# Patient Record
(demographics unavailable — no encounter records)

---

## 2025-01-13 NOTE — HISTORY OF PRESENT ILLNESS
[FreeTextEntry1] : Name DUNG VALLECILLO MRN [id number]  Oct 2 1995 ------------------------------------------------------------------------------------------------------------------------------------------- Date of First Visit: 2023 Referring Provider / PCP: self ([facility]) / Dr. Elliot Dorado ------------------------------------------------------------------------------------------------------------------------------------------- CC: weak urinary stream; urinary frequency  History of Present Illness: DUNG VALLECILLO is a 27-year-old male who presents for evaluation of weak urinary stream, straining, and post void dribbling for over 1 year. Also with urinary frequency and urgency. No dysuria, hematuria, sensation of incomplete emptying, hesitancy. Symptoms are typically worse after drinking alcohol and minimally improved with Tamsulosin, which he has been on for almost 1 year, having been prescribed by another urologist. Acknowledges he has had retrograde ejaculation while on the medication. Denies any diagnostic evaluations such as UDS or cystoscopy. No prior UTI or STI. Drinks alcohol 3 times per week, vapes 2 times per week. Drink 1 cup of coffee per day and 1-2 cups of tea. Rarely eats spicy foods. No history of UTI or STI. No h/o urethral/ trauma. PVR 0 cc ------------------------------------------------------------------------------------------------------------------------------------------- Interval History (2023): No improvement from prior visit avoiding bladder triggers. Had some worsening of symptoms when taking a period off of Flomax but restarted it and now back at baseline. Uroflow: Qmax 12 ml/s ==> he states this is a better subjective flow for him but many times is worse PVR: 10 cc ------------------------------------------------------------------------------------------------------------------------------------------- Interval History (10/20/2023): cystoscopy performed today demonstrates short segment bulbar urethral stricture. ------------------------------------------------------------------------------------------------------------------------------------------- 24: s/p Dorsal [place]lay Buccal Graft Urethroplasty Findings: 3-4cm buccal graft harvested, stricture in mid-bulbar urethra ------------------------------------------------------------------------------------------------------------------------------------------- Interval History 25: Presents today for f/u. Last seen in the office 24.  He is now 1y post op from procedure above.   Post-op, the pt notified me that he was having some numbness in his left hand, particularly his 1st 3 digits and the inner aspect of his 4th.  Had some weakness following surgery, which improved over the course of the week. Additionally, he had some petechiae along the medial aspect of his right forearm that he first noticed prior to last visit. He came for urgent visit with me on 24, at which point an exam was performed, and findings were consistent with carpal tunnel (findings and history were discussed with neurologist over the phone during patient's visit with me).  Recommended carpal tunnel splint.  Petechiae were thought to be due to some mild trauma.   Petechiae had then completely resolved.  With regards to his carpal tunnel, he initially was using the splint, however he experienced worse tingling and pins/needles sensation in his left hand, and the symptoms resolved in the morning with mobility after he removes the splint.  He then stopped wearing and was rec'd to apply voltaren.   Was experiencing gradual improvement, and he felt that his hand was almost 100% by september. Since that time, he only has some very mild deficits at the ends of his fingertips.   He continues to be very happy with his current voiding pattern. He continues to have mild dribbling at the end of his voids - no changes since last visit. Previous bad experience with cysto, therefore discussed modified surveillance schedule at prior appointments.  --------------------------------------------------------------------------------------------------------------------------------------- PMHx: Urethral Stricture PSHx: None SHx: kostas best3, works in [employer]   All: NKDA --------------------------------------------------------------------------------------------------------------------------------------- Physical Exam: General: NAD, sitting on exam table comfortably HEENT: NCAT, EOMI Resp: breathing comfortably on RA, b/l symm chest rise Cardiac: RRR Abd: SNTND Back: (-) CVAT b/l MSK: NATE w/ FROM Psych: appropriate affect : normal appearing circumcised phallus, b/l desc testes, no testicular masses or lesions, b/l vas palpable  Mouth (24): healing well, defect still evident but new layer of mucosa already visible  (24): perineal incision c/d/i, 16Fr fatima in place draining CYU MSK/Neuro (24): does not sense pain over the left thenar eminence and first 3 digits, partial sensation to pain over 4th digit, finger strength 5/5 on right, 3/5 on left. Small amount of nontender petechia over RUE forearm, near the elbow along the medial aspect  Mouth (24): healing well, almost completely healed with small linear defect still present   (24): perineal incision c/d/i, 16Fr fatima in place draining CYU MSK (24): no evidence of petechia over RUE  Mouth (24): well-healed with minor scar evident   (24): perineal incision c/d/i, well-healed MSK (24): no evidence of petechia over RUE, strength improved from prior visits, however still slightly weaker than left --------------------------------------------------------------------------------------------------------------------------------------- Results: Flow 25: qmax 30cc/s, VV 715cc  PVR  25: 0cc --------------------------------------------------------------------------------------------------------------------------------------- Procedures: QUINTIN 2023: Procedure: The patient was placed in a modified right lateral decubitus position with his right leg bent and underneath his straightened left leg.  Penis was prepped with betadine.  Spot fluoroscopy was performed confirming that only one obturator foramen was visible.  A16g angiocath (needle removed), which was already attached to a contrast-filled syringe, was inserted per urethra after being appropriately lubricated.  The penis was placed on stretch, parallel to the patient's right femur.  Contrast was injected per urethra and several representative images were taken. Findings: short ~2-3cm segment in the mid bulbar urethra with 90% narrowing  Pericath RUG and VCUG 2024: Procedure: The patient was placed in a modified right lateral decubitus position with his right leg bent and underneath his straightened left leg. Spot fluoroscopy was performed confirming that only one obturator foramen was visible. A16g angiocath (needle removed), which was already attached to a contrast-filled syringe, was inserted per urethra, alongside the urethral fatima, after being appropriately lubricated. The penis was placed on stretch, parallel to the patient's right femur. Contrast was injected per urethra and several representative images were taken. Pericath RUG was negative for extravasation. Contrast was then instilled into the bladder via gravity drainage through the urethral catheter.  The urethral catheter was then removed, and the patient was then asked to void. He was able to do so, and the findings were negative for leak. Impression: normal post op fermin-catheter RUG and VCUG.  --------------------------------------------------------------------------------------------------------------------------------------- A/P: 29M with bulbar urethral stricture, now 1y post op from urethroplasty with carpal tunnel following surgery, now with near 100% resolution.   1. Urethral Stricture Discussed that flow today was excellent with excellent bladder emptying. Discussed modified cysto schedule of every other year. Given excellent flow today, will forego cysto today until next year. Will perform with nitrous at that visit.    2. Carpal Tunnel/Neuropathic Pain Now with near complete resolution. Will monitor for any changes over the next several months - 1y.    Plan: - cysto + NOS in 1y - RTC 1y or sooner PRN  I spent a total of 20 minutes on face-to-face counseling, coordination of care, review of prior records and clinical documentation.